# Patient Record
Sex: FEMALE | Race: BLACK OR AFRICAN AMERICAN | Employment: UNEMPLOYED | URBAN - METROPOLITAN AREA
[De-identification: names, ages, dates, MRNs, and addresses within clinical notes are randomized per-mention and may not be internally consistent; named-entity substitution may affect disease eponyms.]

---

## 2017-06-26 ENCOUNTER — HOSPITAL ENCOUNTER (INPATIENT)
Facility: CLINIC | Age: 27
LOS: 3 days | Discharge: PSYCHIATRIC OR SUBSTANCE USE FACILITY | DRG: 897 | End: 2017-06-30
Attending: EMERGENCY MEDICINE | Admitting: FAMILY MEDICINE
Payer: COMMERCIAL

## 2017-06-26 DIAGNOSIS — F11.20 HEROIN DEPENDENCE (H): ICD-10-CM

## 2017-06-26 LAB
AMPHETAMINES UR QL SCN: ABNORMAL
BARBITURATES UR QL: ABNORMAL
BENZODIAZ UR QL: ABNORMAL
CANNABINOIDS UR QL SCN: ABNORMAL
COCAINE UR QL: ABNORMAL
ETHANOL UR QL SCN: ABNORMAL
HCG UR QL: NEGATIVE
OPIATES UR QL SCN: ABNORMAL

## 2017-06-26 PROCEDURE — 80307 DRUG TEST PRSMV CHEM ANLYZR: CPT | Performed by: EMERGENCY MEDICINE

## 2017-06-26 PROCEDURE — 81025 URINE PREGNANCY TEST: CPT | Performed by: EMERGENCY MEDICINE

## 2017-06-26 PROCEDURE — 99285 EMERGENCY DEPT VISIT HI MDM: CPT | Mod: Z6 | Performed by: EMERGENCY MEDICINE

## 2017-06-26 PROCEDURE — HZ2ZZZZ DETOXIFICATION SERVICES FOR SUBSTANCE ABUSE TREATMENT: ICD-10-PCS | Performed by: FAMILY MEDICINE

## 2017-06-26 PROCEDURE — 25000125 ZZHC RX 250: Performed by: EMERGENCY MEDICINE

## 2017-06-26 PROCEDURE — 80320 DRUG SCREEN QUANTALCOHOLS: CPT | Performed by: EMERGENCY MEDICINE

## 2017-06-26 PROCEDURE — 99285 EMERGENCY DEPT VISIT HI MDM: CPT | Mod: 25 | Performed by: EMERGENCY MEDICINE

## 2017-06-26 RX ORDER — ONDANSETRON 4 MG/1
4 TABLET, ORALLY DISINTEGRATING ORAL ONCE
Status: COMPLETED | OUTPATIENT
Start: 2017-06-26 | End: 2017-06-26

## 2017-06-26 RX ADMIN — ONDANSETRON 4 MG: 4 TABLET, ORALLY DISINTEGRATING ORAL at 18:35

## 2017-06-26 ASSESSMENT — ENCOUNTER SYMPTOMS
FEVER: 0
DIARRHEA: 0
HEADACHES: 0
NECK STIFFNESS: 0
COLOR CHANGE: 0
DIFFICULTY URINATING: 0
EYE REDNESS: 0
CONFUSION: 0
MYALGIAS: 1
SHORTNESS OF BREATH: 0
DYSPHORIC MOOD: 1
ARTHRALGIAS: 0
VOMITING: 0
NAUSEA: 1
ABDOMINAL PAIN: 1

## 2017-06-26 NOTE — PHARMACY-ADMISSION MEDICATION HISTORY
Admission Medication History status for the 6/26/2017 admission is complete.  See EPIC admission navigator for Prior to Admission medications.    Medication history sources:  Patient     Medication history source reliability: Good - Patient sleeping upon interview start    Medication adherence:  NA    Changes made to PTA medication list (reason)  Added: None  Deleted: None  Changed: None    Additional medication history information (including reliability of information, actions taken by pharmacist):     Time spent in this activity: 1 min    Medication history completed by: Bob Hernandez RPh    Prior to Admission medications    Not on File

## 2017-06-26 NOTE — IP AVS SNAPSHOT
MRN:[id number]                      After Visit Summary   6/26/2017    Dawn Caldwell    MRN: [id number]           Thank you!     Thank you for choosing [facility] for your care. Our goal is always to provide you with excellent care.        Patient Information     Date Of Birth          1990        Designated Caregiver       Most Recent Value    Caregiver    Will someone help with your care after discharge? no      About your hospital stay     You were admitted on:  June 27, 2017 You last received care in the:  [facility]    You were discharged on:  June 30, 2017       Who to Call     For medical emergencies, please call 911.  For non-urgent questions about your medical care, please call your primary care provider or clinic, [phone]          Attending Provider     Provider Specialty    Nj Pascual MD Emergency Medicine    Zenaida, Brant Orozco MD Family Practice       Primary Care Provider Office Phone # Fax #    [facility] [phone]      Further instructions from your care team       Behavioral Discharge Planning and Instructions  THANK YOU FOR CHOOSING THE [address]  [phone]    Summary: You were admitted to [facility] on 6/26/2017 for detoxification from Opioids.  A medical exam was performed that included lab work. You have met with a  and opted to transfer directly to [place] with transportation provided by the program. In addition, suboxone maintenance through [facility].  Please take care and make your recovery a daily priority, Dawn!     Recommendation:  All women residential treatment program. Consider suboxone maintenance. Also consider psychotherapy weekly. The following resources were recommended and provided:  Breaking Free, [facility]ways ~ A Health Crisis Resource & Support Center, Women's AdvocatesJoshua.    Main Diagnoses:  Per Dr. Estevez;  Heroin dependence      Major Treatments, Procedures and Findings:  You were treated for Opioid detoxification using Subutex (buprenorphine). As an outpatient you will be prescribed suboxone, please take this medication as prescribed until it is gone and you have begun suboxone treatment through a clinic provider. You have had a chemical dependency assessment.  You have had labs drawn and those results have been reviewed with you. Please take a copy of your lab work with you to your next primary care physician appointment.    Symptoms to Report:  If you experience more anxiety, confusion, sleeplessness, deep sadness or thoughts of suicide, notify your treatment team or notify your primary care physician. IF ANY OF THE SYMPTOMS YOU ARE EXPERIENCING ARE A MEDICAL EMERGENCY CALL 911 IMMEDIATELY.     Lifestyle Adjustment: Adjust your lifestyle to get enough sleep, relaxation, exercise and good nutrition. Continue to develop healthy coping skills to decrease stress and promote a sober living environment. Do not use mood altering substances including alcohol, illegal drugs or addictive medications other than what is currently prescribed.     Treatment Program Provider:  Admission on Friday, 6/29/2017 with pickup between 1 - 2 PM  [facility]  [address] [place] [address]  Ph:  [phone]   [place]:  [phone]    Suboxone Maintenance Provider:  Appointment on  at    [facility]  [address] [place] [address]  Ph:  [phone]    You report you are awaiting a return call at this time from the clinic after calling them yesterday.    Resources:   [facility] ~ A Health Crisis Resource & Support Center:  744.249.9446.   [employer], a non-profit organization to support and assist post-incarceration:  889.892.4213.  Breaking Free:  830-205- 2974  Women's Advocates ~ Breaking the Cycle of Domestic Violence:   066-793-8968  http://www.aastpaul.org/?topic=8  http://a[place].org/meetings/  http://www.nam[place].org/index.php/meeting-list-pdf  http://www.harmreduction.org  [facility] 885-436-9841  Support Group:  AA/NA and Sponsor/support  Crisis Intervention: 941.256.1034 or 780-821-9517 (TTY: 978.497.3877).  Call anytime for help.  National [place] on Mental Illness (www.mn.quin.org): 523.868.2598 or 158-265-2993.  Alcoholics Anonymous (www.alcoholics-anonymous.org): Check your phone book for your local chapter.  Suicide Awareness Voices of Education (SAVE) (www.save.org): 980-862-HGLX (0407)  National Suicide Prevention Line (www.mentalA10 Networksmn.org): 488-692-ZTLY (8917)  Mental Health Consumer/Survivor Network of [place] (www.mhcsn.net): 686.386.1484 or 293-150-5638  Mental Health Association of [place] (www.mentalhealth.org): 349.547.4391 or 275-536-5706   Substance Abuse and Mental Health Services (www.samhsa.gov)    [place] Recovery Connection ([facility])  [facility] connects people seeking recovery to resources that help foster and sustain long-term recovery.  Whether you are seeking resources for treatment, transportation, housing, job training, education, health care or other pathways to recovery, [facility] is a great place to start.  826.874.3566.  www.[place]arecovery.org    General Medication Instructions:   See your medication sheet(s) for instructions.   Take all medicines as directed.  Make no changes unless your doctor suggests them.   Go to all your doctor visits.  Be sure to have all your required lab tests. This way, your medicines can be refilled on time.  AA/NA and Sponsors are excellent resources for support and you can find one at any support group meeting.  Do not use any forms of alcohol.    Please Note:  If you have any questions at anytime after you are discharged please call the [facility] [place] detox unit 3AW unit at [phone].  [facility],  "Behavioral Intake [phone]  Please take this discharge folder with you to all your follow up appointments, it contains your lab results, diagnosis, medication list and discharge recommendations.      THANK YOU FOR CHOOSING THE [facility]       Pending Results     No orders found from 2017 to 2017.            Statement of Approval     Ordered          17 0946  I have reviewed and agree with all the recommendations and orders detailed in this document.  EFFECTIVE NOW     Approved and electronically signed by:  Brant Estevez MD           17 0914  I have reviewed and agree with all the recommendations and orders detailed in this document.  EFFECTIVE NOW     Approved and electronically signed by:  Brant Estevez MD             Admission Information     Date & Time Provider Department Dept. Phone    2017 Brant Estevez MD [facility] [phone]      Your Vitals Were     Blood Pressure Pulse Temperature Respirations Height Weight    111/72 52 96.3  F (35.7  C) (Tympanic) 16 1.626 m (5' 4\") 49 kg (108 lb)    Last Period Pulse Oximetry BMI (Body Mass Index)             2017 98% 18.54 kg/m2         MyChart Information     [employer] lets you send messages to your doctor, view your test results, renew your prescriptions, schedule appointments and more. To sign up, go to [email or web address] . Click on \"Log in\" on the left side of the screen, which will take you to the Welcome page. Then click on \"Sign up Now\" on the right side of the page.     You will be asked to enter the access code listed below, as well as some personal information. Please follow the directions to create your username and password.     Your access code is: [email or web address]  Expires: 2017  7:22 AM     Your access code will  in 90 days. If you need help or a new code, please call your [place] clinic or [phone].        Care EveryWhere ID     This " is your Care EveryWhere ID. This could be used by other organizations to access your [facility] medical records  [id number]        Equal Access to Services     ALISSA HOWELL : Hadii aad ku hadkathereji Greer, wasaurabhda luflorentinobasilia, jaita kajenniferda sergio, escobar yates jasonsheila ricardo laaleidapineda seven. So [facility] [phone].    ATENCIÓN: Si habla español, tiene a mott disposición servicios gratuitos de asistencia lingüística. Llame al [phone].    We comply with applicable federal civil rights laws and [place] laws. We do not discriminate on the basis of race, color, national origin, age, disability sex, sexual orientation or gender identity.               Review of your medicines      START taking        Dose / Directions    buprenorphine-naloxone 8-2 MG Subl sublingual tablet   Commonly known as:  SUBOXONE        Dose:  1 tablet   Place 1 tablet under the tongue daily   Quantity:  15 each   Refills:  1            Where to get your medicines      Some of these will need a paper prescription and others can be bought over the counter. Ask your nurse if you have questions.     Bring a paper prescription for each of these medications     buprenorphine-naloxone 8-2 MG Subl sublingual tablet                Protect others around you: Learn how to safely use, store and throw away your medicines at www.disposemymeds.org.             Medication List: This is a list of all your medications and when to take them. Check marks below indicate your daily home schedule. Keep this list as a reference.      Medications           Morning Afternoon Evening Bedtime As Needed    buprenorphine-naloxone 8-2 MG Subl sublingual tablet   Commonly known as:  SUBOXONE   Place 1 tablet under the tongue daily

## 2017-06-26 NOTE — IP AVS SNAPSHOT
[facility]    [facility] [place] [id number]    Phone:  [phone]                                       After Visit Summary   6/26/2017    Dawn Caldwell    MRN: [id number]           After Visit Summary Signature Page     I have received my discharge instructions, and my questions have been answered. I have discussed any challenges I see with this plan with the nurse or doctor.    ..........................................................................................................................................  Patient/Patient Representative Signature      ..........................................................................................................................................  Patient Representative Print Name and Relationship to Patient    ..................................................               ................................................  Date                                            Time    ..........................................................................................................................................  Reviewed by Signature/Title    ...................................................              ..............................................  Date                                                            Time

## 2017-06-26 NOTE — ED PROVIDER NOTES
History     Chief Complaint   Patient presents with     Withdrawal     States she is withdrawing from heroin and cocaine and xanax.  Feeling weak, loss of appetite, feeling paranoid.  Would like detox.      DIANE Caldwell is a 26 year old female who presents to the emergency Department seeking detox from heroin.  The patient states that she's been using one and a half grams of heroin daily for the past 2 years.  She states that she uses the heroin both IV and by smoking.  She last used approximately 24 hours ago.  Patient endorses withdrawal symptoms that include abdominal cramping, nausea, diaphoresis, chills, and myalgias.    Patient also states that 2 weeks ago, she began using Xanax.  She was using 2 mg per day.  She states that she has not used Xanax in the past 4 days and denies any symptoms when she stopped using.  The patient also reports using crack cocaine occasionally.  She smokes that.  She last used it yesterday.  Patient reports depression but denies suicidal ideation or any active plan.  She supposed to be on antidepressants but has not taken any.  She denies any recent illness or medical concerns.    I have reviewed the Medications, Allergies, Past Medical and Surgical History, and Social History in the Epic system.    Review of Systems   Constitutional: Negative for fever.   HENT: Negative for congestion.    Eyes: Negative for redness.   Respiratory: Negative for shortness of breath.    Cardiovascular: Negative for chest pain.   Gastrointestinal: Positive for abdominal pain (cramping) and nausea. Negative for diarrhea and vomiting.   Genitourinary: Negative for difficulty urinating.   Musculoskeletal: Positive for myalgias. Negative for arthralgias and neck stiffness.   Skin: Negative for color change.   Neurological: Negative for headaches.   Psychiatric/Behavioral: Positive for dysphoric mood. Negative for confusion.   All other systems reviewed and are negative.      Physical Exam   BP:  128/82  Pulse: 70  Temp: 98.8  F (37.1  C)  Resp: 16  Weight: 49.1 kg (108 lb 3 oz)  SpO2: 98 %  Physical Exam   Constitutional: She appears well-developed and well-nourished. No distress.   HENT:   Head: Normocephalic and atraumatic.   Mouth/Throat: Oropharynx is clear and moist. No oropharyngeal exudate.   Eyes: Pupils are equal, round, and reactive to light. No scleral icterus.   Cardiovascular: Normal rate, regular rhythm, normal heart sounds and intact distal pulses.    Pulmonary/Chest: Effort normal and breath sounds normal. No respiratory distress.   Abdominal: Soft. Bowel sounds are normal. There is no tenderness.   Musculoskeletal: Normal range of motion. She exhibits no edema or tenderness.   Left arm injection site appears clean and without erythema, swelling, or drainage.   Neurological: She is alert. She has normal strength. Coordination normal.   Skin: Skin is warm and dry. No rash noted. She is not diaphoretic.   Psychiatric: She has a normal mood and affect. Her behavior is normal.   Nursing note and vitals reviewed.      ED Course     ED Course     Procedures             Results for orders placed or performed during the hospital encounter of 06/26/17 (from the past 24 hour(s))   Drug abuse screen 6 urine (tox)   Result Value Ref Range    Amphetamine Qual Urine  NEG     Negative   Cutoff for a negative amphetamine is 500 ng/mL or less.      Barbiturates Qual Urine  NEG     Negative   Cutoff for a negative barbiturate is 200 ng/mL or less.      Benzodiazepine Qual Urine  NEG     Negative   Cutoff for a negative benzodiazepine is 200 ng/mL or less.      Cannabinoids Qual Urine (A) NEG     Positive   Cutoff for a positive cannabinoid is greater than 50 ng/mL. This is an   unconfirmed screening result to be used for medical purposes only.      Cocaine Qual Urine (A) NEG     Positive   Cutoff for a positive cocaine is greater than 300 ng/mL. This is an unconfirmed   screening result to be used for medical  purposes only.      Ethanol Qual Urine  NEG     Negative   Cutoff for a negative urine ethanol is 0.05 g/dL or less      Opiates Qualitative Urine (A) NEG     Positive   Cutoff for a positive opiate is greater than 300 ng/mL. This is an unconfirmed   screening result to be used for medical purposes only.     HCG qualitative urine   Result Value Ref Range    HCG Qual Urine Negative NEG             Assessments & Plan (with Medical Decision Making)   26 year old female emergency department seeking detox from heroin.  Patient's been using heroin daily for the past 2 years and experiences withdrawal symptoms with attempted cessation.  Patient last used approximately 24 hours ago and is currently experiencing withdrawal symptoms.  The patient does not have any medical concerns.  Her IV injection sites do not appear infected.  The patient has depression but no active suicide ideation or plan for self-harm.  The patient appears medically stable for detox admission.    I have reviewed the nursing notes.    I have reviewed the findings, diagnosis, plan and need for follow up with the patient.    New Prescriptions    No medications on file       Final diagnoses:   Heroin dependence (H)       6/26/2017   [facility], [place], EMERGENCY DEPARTMENT     Nj Pascual MD  06/26/17 8414

## 2017-06-26 NOTE — IP AVS SNAPSHOT
" [facility]: [phone]                                              INTERAGENCY TRANSFER FORM - LAB / IMAGING / EKG / EMG RESULTS   2017                    Hospital Admission Date: 2017  TASHA DE LA TORRE   : 1990  Sex: Female        Attending Provider: Brant Estevez MD     Allergies:  Codeine    Infection:  None   Service:  CHEMICAL DEP    Ht:  1.626 m (5' 4\")   Wt:  49 kg (108 lb)   Admission Wt:  49.1 kg (108 lb 3 oz)    BMI:  18.54 kg/m 2   BSA:  1.49 m 2            Patient PCP Information     Provider PCP Type    [facility]         Lab Results - 3 Days      HIV Antigen Antibody Combo [[id number]]  Resulted: 17 1313, Result status: Final result    Ordering provider: Brant Estevez MD  17 Resulting lab: [facility]    Specimen Information    Type Source Collected On   Blood  17 0750          Components       Value Reference Range Flag Lab   HIV Antigen Antibody Combo -- NR  75   Result:         Nonreactive   HIV-1 p24 Ag & HIV-1/HIV-2 Ab Not Detected              Comprehensive metabolic panel [[id number]] (Abnormal)  Resulted: 17 0844, Result status: Final result    Ordering provider: Brant Estevez MD  17 Resulting lab: [facility]    Specimen Information    Type Source Collected On   Blood  17 0750          Components       Value Reference Range Flag Lab   Sodium 144 133 - 144 mmol/L  13   Potassium 5.0 3.4 - 5.3 mmol/L  13   Chloride 109 94 - 109 mmol/L  13   Carbon Dioxide 28 20 - 32 mmol/L  13   Anion Gap 7 3 - 14 mmol/L  13   Glucose 101 70 - 99 mg/dL H 13   Urea Nitrogen 16 7 - 30 mg/dL  13   Creatinine 1.29 0.52 - 1.04 mg/dL H 13   GFR Estimate 49 >60 mL/min/1.7m2 L 13   Comment:  Non  GFR Calc   GFR Estimate If Black 60 >60 mL/min/1.7m2 L 13   Comment:  African American GFR Calc   Calcium 8.9 " 8.5 - 10.1 mg/dL  13   Bilirubin Total 0.4 0.2 - 1.3 mg/dL  13   Albumin 3.2 3.4 - 5.0 g/dL L 13   Protein Total 6.5 6.8 - 8.8 g/dL L 13   Alkaline Phosphatase 120 40 - 150 U/L  13   ALT 61 0 - 50 U/L H 13   AST 69 0 - 45 U/L H 13            CBC with platelets [[id number]]  Resulted: 06/28/17 0822, Result status: Final result    Ordering provider: Brant Estevez MD  06/28/17 0030 Resulting lab: [facility]    Specimen Information    Type Source Collected On   Blood  06/28/17 0750          Components       Value Reference Range Flag Lab   WBC 10.4 4.0 - 11.0 10e9/L  13   RBC Count 4.09 3.8 - 5.2 10e12/L  13   Hemoglobin 12.9 11.7 - 15.7 g/dL  13   Hematocrit 38.3 35.0 - 47.0 %  13   MCV 94 78 - 100 fl  13   MCH 31.5 26.5 - 33.0 pg  13   MCHC 33.7 31.5 - 36.5 g/dL  13   RDW 13.4 10.0 - 15.0 %  13   Platelet Count 285 150 - 450 10e9/L  13            Testing Performed By     Lab - Abbreviation Name Director Address Valid Date Range    13 - Unknown [facility] Unknown [address] [facility] [id number] 01/15/15 0916 - Present    75 - Unknown [facility] Unknown [address] [facility] [address] 01/15/15 1019 - Present            Unresulted Labs     None      Encounter-Level Documents:     There are no encounter-level documents.      Order-Level Documents:     There are no order-level documents.

## 2017-06-27 PROBLEM — F11.20 HEROIN DEPENDENCE (H): Status: ACTIVE | Noted: 2017-06-27

## 2017-06-27 PROCEDURE — 12800008 ZZH R&B CD ADULT

## 2017-06-27 PROCEDURE — 25000132 ZZH RX MED GY IP 250 OP 250 PS 637: Performed by: FAMILY MEDICINE

## 2017-06-27 PROCEDURE — 99222 1ST HOSP IP/OBS MODERATE 55: CPT | Mod: AI | Performed by: FAMILY MEDICINE

## 2017-06-27 PROCEDURE — 25000132 ZZH RX MED GY IP 250 OP 250 PS 637: Performed by: EMERGENCY MEDICINE

## 2017-06-27 RX ORDER — NALOXONE HYDROCHLORIDE 0.4 MG/ML
.1-.4 INJECTION, SOLUTION INTRAMUSCULAR; INTRAVENOUS; SUBCUTANEOUS
Status: DISCONTINUED | OUTPATIENT
Start: 2017-06-27 | End: 2017-06-30 | Stop reason: HOSPADM

## 2017-06-27 RX ORDER — BUPRENORPHINE 2 MG/1
4 TABLET SUBLINGUAL ONCE
Status: COMPLETED | OUTPATIENT
Start: 2017-06-27 | End: 2017-06-27

## 2017-06-27 RX ORDER — NICOTINE 21 MG/24HR
1 PATCH, TRANSDERMAL 24 HOURS TRANSDERMAL DAILY
Status: DISCONTINUED | OUTPATIENT
Start: 2017-06-27 | End: 2017-06-30 | Stop reason: HOSPADM

## 2017-06-27 RX ORDER — BUPRENORPHINE 2 MG/1
4 TABLET SUBLINGUAL 2 TIMES DAILY
Status: DISCONTINUED | OUTPATIENT
Start: 2017-06-28 | End: 2017-06-30 | Stop reason: HOSPADM

## 2017-06-27 RX ORDER — HYDROXYZINE HYDROCHLORIDE 25 MG/1
25-50 TABLET, FILM COATED ORAL EVERY 4 HOURS PRN
Status: DISCONTINUED | OUTPATIENT
Start: 2017-06-27 | End: 2017-06-30 | Stop reason: HOSPADM

## 2017-06-27 RX ORDER — TRAZODONE HYDROCHLORIDE 50 MG/1
50 TABLET, FILM COATED ORAL
Status: DISCONTINUED | OUTPATIENT
Start: 2017-06-27 | End: 2017-06-30 | Stop reason: HOSPADM

## 2017-06-27 RX ADMIN — BUPRENORPHINE HCL 4 MG: 2 TABLET SUBLINGUAL at 09:45

## 2017-06-27 RX ADMIN — HYDROXYZINE HYDROCHLORIDE 50 MG: 25 TABLET ORAL at 08:42

## 2017-06-27 RX ADMIN — TRAZODONE HYDROCHLORIDE 50 MG: 50 TABLET ORAL at 21:17

## 2017-06-27 RX ADMIN — BUPRENORPHINE HCL 4 MG: 2 TABLET SUBLINGUAL at 16:16

## 2017-06-27 RX ADMIN — NICOTINE 1 PATCH: 21 PATCH, EXTENDED RELEASE TRANSDERMAL at 08:42

## 2017-06-27 ASSESSMENT — ACTIVITIES OF DAILY LIVING (ADL)
GROOMING: INDEPENDENT
DRESS: INDEPENDENT
GROOMING: INDEPENDENT
LAUNDRY: WITH SUPERVISION
LAUNDRY: WITH SUPERVISION
ORAL_HYGIENE: INDEPENDENT
DRESS: SCRUBS (BEHAVIORAL HEALTH)
GROOMING: INDEPENDENT
ORAL_HYGIENE: INDEPENDENT

## 2017-06-27 NOTE — PROGRESS NOTES
CASE MANAGEMENT NOTE    UPDATE:  Patient approached this writer asking for direct transfer to [facility] following detox. She spoke with her family today who are supportive and are requiring her completion of treatment before returning home.    ORIGINAL:  Writer met with patient to discuss discharge planning. Patient lives with her aunt who is a  in a supportive sober environment. Patient became tearful when she explained that neither her aunt nor family know she is here in detox. Her last treatment was in [place] in 2014 followed by 2.5 years of detention. She had a Rule 25 assessment completed at [facility] about 1 month ago and the recommendations were inpatient treatment. She is asking for placement in an outpatient treatment program and methadone maintenance in/near [facility] so her family would not find out. Patient was directed to complete her paperwork.     Carolina Sharpe) TAMY Leach, [facility]  3A Psychotherapist  [phone]

## 2017-06-27 NOTE — PROGRESS NOTES
"CLINICAL NUTRITION SERVICES - ASSESSMENT NOTE     Nutrition Prescription    RECOMMENDATIONS FOR MDs/PROVIDERS TO ORDER:  None    Malnutrition Status:    Patient does not meet two of the  criteria necessary for diagnosing malnutrition    Recommendations already ordered by Registered Dietitian (RD):  Ordered fruit + ice cream @ 10am snack    Future/Additional Recommendations:  1. Monitor PO intake/weight trends and snack acceptance to adjust orders as needed. If pt unable to consume at least 50% of meals offer nutritional supplements to increase PO intakes.      REASON FOR ASSESSMENT  Dawn Caldwell is a/an 26 year old female assessed by the dietitian for Admission Nutrition Risk Screen for unintentional loss of 10# or more in the past two months    NUTRITION HISTORY  Per discussion with pt, she has noticed a 10# weight loss since December compared to her current weight. She reports about a month ago, her weight was even lower, but has been working to eat better to increase her weight back up. She reports eating poorly PTA 2/2 drug use and not eating. However, when she is eating well she follows a regular diet and tries to consume 3 meals per day.     CURRENT NUTRITION ORDERS  Diet: Regular  Intake/Tolerance: Pt reports her appetite is good and feels hungry to eat, however, is experiencing nausea and vomiting. She reports typically the nausea medication helps and is able to eat well. She is motivated to eat to increase her weight and anticipates her PO intake to improve.     LABS  Labs reviewed    MEDICATIONS  Medications reviewed    ANTHROPOMETRICS  Height: 162.6 cm (5' 4\")  Most Recent Weight: 49 kg (108 lb)    IBW: 54.5 kg (120#)  BMI: 18.54 kg/m^2 - Borderline Underweight vs. Normal BMI   Weight History: Difficult to assess weight trends given lack of recent weight records. Per pt, her highest weight was about 117# this past December and is now around 108#. She reports her weight was even lower about 1 month prior, " and was trying to eat more to increase weight.  Per pts report, 8% wt loss x 6 months.   Wt Readings from Last 10 Encounters:   06/27/17 49 kg (108 lb)     Dosing Weight: 49 kg - based on admission weight    ASSESSED NUTRITION NEEDS  Estimated Energy Needs: 7224-4320 kcals/day (30 - 35 kcals/kg)  Justification: Borderline Underweight   Estimated Protein Needs: 49-59 grams protein/day (1 - 1.2 grams of pro/kg)  Justification: Repletion  Estimated Fluid Needs:  (1 mL/kcal)   Justification: Maintenance    PHYSICAL FINDINGS  See malnutrition section below.  Appears well-developed and well-nourished per ED     MALNUTRITION  % Intake: Decreased intake does not meet criteria  % Weight Loss: Up to 10% in 6 months (non-severe)  Subcutaneous Fat Loss: None observed  Muscle Loss: None observed  Fluid Accumulation/Edema: None noted  Malnutrition Diagnosis: Patient does not meet two of the above criteria necessary for diagnosing malnutrition    NUTRITION DIAGNOSIS  Predicted inadequate nutrient intake related to hx of poor PO intake 2/2 drug use and 8% wt loss x 6 months, however PO intake is improving and anticipate PO will continue to improve with treatment.       INTERVENTIONS  Implementation  Nutrition Education: Discussed nutrition history and PO since admission. Discussed menu ordering and snacks available on the unit. Discussed oral nutrition supplements and pt open to trying snacks such as fruit and ice cream at this time. Encouraged adequate PO of food and fluids.   Modify composition of meals/snacks - Ordered snacks of cantaloupe + ice cream (vanilla) at 10am      Goals  Patient to consume % of nutritionally adequate meal trays TID, or the equivalent with supplements/snacks.     Monitoring/Evaluation  Progress toward goals will be monitored and evaluated per protocol.    Nanda Mercer RD  Unit Pager: [phone]    I reviewed and agree with above.  Carleen Sampson, JACKELYN, LD

## 2017-06-27 NOTE — PROGRESS NOTES
S;Pt via Ed for Detox from opiates.  B;Pt has been using 1gm heroin, Iv or smoked daily, last use 6/25/17@1000. Pt also using crack cocaine,$50 dalilt, last use 6/25/17@1000. Pt also used 2mg Xanax daily x10 days,last use 6/22/17. Pt has a history of hepatitis C. Pt denies any other chronic or any acute illness or injury. Pt has a history of depression but denies any past or current suicidal ideation.  A:Pt is in mild/moderate opiate withdrawal on admission.  R:Pt would like to iniate withdrawal treatment with attending MD in am. Will provide comfort meds as needed. 15 min checks and withdrawal precautions for safety.

## 2017-06-27 NOTE — PROGRESS NOTES
06/27/17 0033   Patient Belongings   Did you bring any home meds/supplements to the hospital?  No   Patient Belongings other (see comments)   Disposition of Belongings Tote, Security, Locked Drawer   Belongings Search Yes   Clothing Search Yes   Second Staff Delilah RN 3AW, Beth RN 4AW     Tote: white top, black jacket, make-up and assorted pens in brown striped purse, cigarettes, sunglasses, glasses, 2 bracelets,  port, chapstick, notebook    Locked Drawer: cell phone, wallet,     Security: $25, visa 3840, macys 5746, EBT 0343, [place] drivers license, [employer] ID on lanyard with keys    ADMISSION:  I am responsible for any personal items that are not sent to the safe or pharmacy. [facility] is not responsible for loss, theft or damage of any property in my possession.    Patient Signature _____________________ Date/Time _____________________    Staff Signature _______________________ Date/Time _____________________    2nd Staff person, if patient is unable/unwilling to sign  ___________________________________ Date/Time _____________________  DISCHARGE:  All personal items have been returned to me.    Patient Signature _____________________ Date/Time _____________________    Staff Signature _______________________ Date/Time _____________________

## 2017-06-28 LAB
ALBUMIN SERPL-MCNC: 3.2 G/DL (ref 3.4–5)
ALP SERPL-CCNC: 120 U/L (ref 40–150)
ALT SERPL W P-5'-P-CCNC: 61 U/L (ref 0–50)
ANION GAP SERPL CALCULATED.3IONS-SCNC: 7 MMOL/L (ref 3–14)
AST SERPL W P-5'-P-CCNC: 69 U/L (ref 0–45)
BILIRUB SERPL-MCNC: 0.4 MG/DL (ref 0.2–1.3)
BUN SERPL-MCNC: 16 MG/DL (ref 7–30)
CALCIUM SERPL-MCNC: 8.9 MG/DL (ref 8.5–10.1)
CHLORIDE SERPL-SCNC: 109 MMOL/L (ref 94–109)
CO2 SERPL-SCNC: 28 MMOL/L (ref 20–32)
CREAT SERPL-MCNC: 1.29 MG/DL (ref 0.52–1.04)
ERYTHROCYTE [DISTWIDTH] IN BLOOD BY AUTOMATED COUNT: 13.4 % (ref 10–15)
GFR SERPL CREATININE-BSD FRML MDRD: 49 ML/MIN/1.7M2
GLUCOSE SERPL-MCNC: 101 MG/DL (ref 70–99)
HCT VFR BLD AUTO: 38.3 % (ref 35–47)
HGB BLD-MCNC: 12.9 G/DL (ref 11.7–15.7)
HIV 1+2 AB+HIV1 P24 AG SERPL QL IA: NORMAL
MCH RBC QN AUTO: 31.5 PG (ref 26.5–33)
MCHC RBC AUTO-ENTMCNC: 33.7 G/DL (ref 31.5–36.5)
MCV RBC AUTO: 94 FL (ref 78–100)
PLATELET # BLD AUTO: 285 10E9/L (ref 150–450)
POTASSIUM SERPL-SCNC: 5 MMOL/L (ref 3.4–5.3)
PROT SERPL-MCNC: 6.5 G/DL (ref 6.8–8.8)
RBC # BLD AUTO: 4.09 10E12/L (ref 3.8–5.2)
SODIUM SERPL-SCNC: 144 MMOL/L (ref 133–144)
WBC # BLD AUTO: 10.4 10E9/L (ref 4–11)

## 2017-06-28 PROCEDURE — 12800008 ZZH R&B CD ADULT

## 2017-06-28 PROCEDURE — 80053 COMPREHEN METABOLIC PANEL: CPT | Performed by: FAMILY MEDICINE

## 2017-06-28 PROCEDURE — 99231 SBSQ HOSP IP/OBS SF/LOW 25: CPT | Performed by: FAMILY MEDICINE

## 2017-06-28 PROCEDURE — 87389 HIV-1 AG W/HIV-1&-2 AB AG IA: CPT | Performed by: FAMILY MEDICINE

## 2017-06-28 PROCEDURE — 25000132 ZZH RX MED GY IP 250 OP 250 PS 637: Performed by: FAMILY MEDICINE

## 2017-06-28 PROCEDURE — 36415 COLL VENOUS BLD VENIPUNCTURE: CPT | Performed by: FAMILY MEDICINE

## 2017-06-28 PROCEDURE — 25000132 ZZH RX MED GY IP 250 OP 250 PS 637: Performed by: EMERGENCY MEDICINE

## 2017-06-28 PROCEDURE — 85027 COMPLETE CBC AUTOMATED: CPT | Performed by: FAMILY MEDICINE

## 2017-06-28 RX ORDER — BUPRENORPHINE AND NALOXONE 4; 1 MG/1; MG/1
1 FILM, SOLUBLE BUCCAL; SUBLINGUAL
Qty: 30 FILM | Refills: 0 | Status: SHIPPED | OUTPATIENT
Start: 2017-06-28 | End: 2017-06-29

## 2017-06-28 RX ORDER — ONDANSETRON 4 MG/1
4 TABLET, ORALLY DISINTEGRATING ORAL EVERY 6 HOURS PRN
Status: DISCONTINUED | OUTPATIENT
Start: 2017-06-28 | End: 2017-06-30 | Stop reason: HOSPADM

## 2017-06-28 RX ADMIN — HYDROXYZINE HYDROCHLORIDE 50 MG: 25 TABLET ORAL at 20:14

## 2017-06-28 RX ADMIN — HYDROXYZINE HYDROCHLORIDE 50 MG: 25 TABLET ORAL at 09:18

## 2017-06-28 RX ADMIN — BUPRENORPHINE HCL 4 MG: 2 TABLET SUBLINGUAL at 09:15

## 2017-06-28 RX ADMIN — BUPRENORPHINE HCL 4 MG: 2 TABLET SUBLINGUAL at 16:10

## 2017-06-28 RX ADMIN — NICOTINE 1 PATCH: 21 PATCH, EXTENDED RELEASE TRANSDERMAL at 09:15

## 2017-06-28 ASSESSMENT — ENCOUNTER SYMPTOMS
CHEST PRESSURE: 0
ACTIVITY IMPAIRMENT: NORMAL
DIARRHEA: 0
NO PATIENT REPORTED PAIN: 1
SHORTNESS OF BREATH: 0
ANOREXIA: 0
COUGH: 0
DIETARY ISSUES: ADEQUATE INTAKE
WEAKNESS: 0

## 2017-06-28 ASSESSMENT — ACTIVITIES OF DAILY LIVING (ADL)
GROOMING: INDEPENDENT
DRESS: STREET CLOTHES
LAUNDRY: WITH SUPERVISION
ORAL_HYGIENE: INDEPENDENT
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
## 2017-06-29 PROCEDURE — 99231 SBSQ HOSP IP/OBS SF/LOW 25: CPT | Performed by: FAMILY MEDICINE

## 2017-06-29 PROCEDURE — 25000132 ZZH RX MED GY IP 250 OP 250 PS 637: Performed by: FAMILY MEDICINE

## 2017-06-29 PROCEDURE — H2035 A/D TX PROGRAM, PER HOUR: HCPCS

## 2017-06-29 PROCEDURE — 12800008 ZZH R&B CD ADULT

## 2017-06-29 PROCEDURE — 25000132 ZZH RX MED GY IP 250 OP 250 PS 637: Performed by: EMERGENCY MEDICINE

## 2017-06-29 RX ORDER — BUPRENORPHINE HYDROCHLORIDE AND NALOXONE HYDROCHLORIDE DIHYDRATE 8; 2 MG/1; MG/1
1 TABLET SUBLINGUAL DAILY
Qty: 15 EACH | Refills: 1 | Status: SHIPPED | OUTPATIENT
Start: 2017-06-29

## 2017-06-29 RX ADMIN — HYDROXYZINE HYDROCHLORIDE 50 MG: 25 TABLET ORAL at 20:14

## 2017-06-29 RX ADMIN — BUPRENORPHINE HCL 4 MG: 2 TABLET SUBLINGUAL at 08:47

## 2017-06-29 RX ADMIN — HYDROXYZINE HYDROCHLORIDE 50 MG: 25 TABLET ORAL at 08:47

## 2017-06-29 RX ADMIN — NICOTINE 1 PATCH: 21 PATCH, EXTENDED RELEASE TRANSDERMAL at 08:47

## 2017-06-29 RX ADMIN — TRAZODONE HYDROCHLORIDE 50 MG: 50 TABLET ORAL at 21:33

## 2017-06-29 RX ADMIN — BUPRENORPHINE HCL 4 MG: 2 TABLET SUBLINGUAL at 16:32

## 2017-06-29 ASSESSMENT — ACTIVITIES OF DAILY LIVING (ADL)
GROOMING: INDEPENDENT
GROOMING: INDEPENDENT
DRESS: STREET CLOTHES
LAUNDRY: WITH SUPERVISION
ORAL_HYGIENE: INDEPENDENT

## 2017-06-29 ASSESSMENT — ENCOUNTER SYMPTOMS
WEAKNESS: 0
ANOREXIA: 0
ACTIVITY IMPAIRMENT: NORMAL
DIARRHEA: 0
COUGH: 0
CHEST PRESSURE: 0
SHORTNESS OF BREATH: 0
DIETARY ISSUES: ADEQUATE INTAKE
NO PATIENT REPORTED PAIN: 1

## 2017-06-29 NOTE — PROGRESS NOTES
"Dawn Caldwell is a 26 year old female patient.  1. Heroin dependence (H)      Past Medical History:   Diagnosis Date     Hep C w/o coma, chronic (H)      Wilms' tumor of left kidney with favorable histology (H)      Current Outpatient Prescriptions   Medication Sig Dispense Refill     buprenorphine-naloxone (SUBOXONE) 8-2 MG SUBL sublingual tablet Place 1 tablet under the tongue daily 15 each 1     Allergies   Allergen Reactions     Codeine Swelling     Tongue swelling     Active Problems:    Heroin dependence (H)    Blood pressure 115/73, pulse 53, temperature 97.2  F (36.2  C), temperature source Oral, resp. rate 16, height 5' 4\" (1.626 m), weight 108 lb (49 kg), last menstrual period 05/16/2017, SpO2 98 %.    Subjective:  Symptoms:  Stable.  No shortness of breath, malaise, cough, chest pain, weakness, headache, chest pressure, anorexia, diarrhea or anxiety.    Diet:  Adequate intake.    Activity level: Normal.    Pain:  She reports no pain.      Objective:  General Appearance:  Comfortable, well-appearing and in no acute distress.    Vital signs: (most recent): Blood pressure 115/73, pulse 53, temperature 97.2  F (36.2  C), temperature source Oral, resp. rate 16, height 5' 4\" (1.626 m), weight 108 lb (49 kg), last menstrual period 05/16/2017, SpO2 98 %.  Vital signs are normal.    Lungs:  Normal respiratory rate and normal effort.  Breath sounds clear to auscultation.    Neurological: Patient is alert and oriented to person, place and time.    Skin:  Warm and dry.      Assessment:    Condition: In serious condition.  Improving.   (Opiate Dependence and Withdrawal   ).     Plan:   (Continue Buprenorphine 4 mg BID  Discharge on sub 8/2 mg once daily #15 with 1 refill  Refer to Suboxone provider covered by her insurance  Arrange treatment - [facility]? [facility]? (Has school beginning in 30 days    Likely discharge tomorrow to live with aunt    20 minutes were spent with patient with more than 50% of time " spent in counseling and coordination of care ).       Brant Estevez  6/29/2017

## 2017-06-29 NOTE — PROGRESS NOTES
Behavioral Health  Note    Behavioral Health  Spirituality Group Note    UNIT 3AW    Name: Dawn Caldwell YOB: 1990   MRN: [id number] Age: 26 year old      Patient attended -led group, which included discussion of spirituality, coping with illness and building resilience.    Patient attended group for 1.0 hrs.    The patient actively participated in group discussion and patient demonstrated an appreciation of topic's application for their personal circumstances. Dawn participated in yoga, reflection, and a lovingkindess meditation activity, relating the topics to sobriety and the role of the human spirit/spirituality in healing and wholeness.      Emma Bass MDiv, [facility]  Staff   Pager [phone]
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
## 2017-06-30 VITALS
BODY MASS INDEX: 18.44 KG/M2 | OXYGEN SATURATION: 98 % | DIASTOLIC BLOOD PRESSURE: 64 MMHG | SYSTOLIC BLOOD PRESSURE: 102 MMHG | HEIGHT: 64 IN | RESPIRATION RATE: 16 BRPM | WEIGHT: 108 LBS | HEART RATE: 53 BPM | TEMPERATURE: 98.5 F

## 2017-06-30 PROCEDURE — 25000132 ZZH RX MED GY IP 250 OP 250 PS 637: Performed by: FAMILY MEDICINE

## 2017-06-30 PROCEDURE — 25000132 ZZH RX MED GY IP 250 OP 250 PS 637: Performed by: EMERGENCY MEDICINE

## 2017-06-30 PROCEDURE — 99238 HOSP IP/OBS DSCHRG MGMT 30/<: CPT | Performed by: FAMILY MEDICINE

## 2017-06-30 RX ADMIN — BUPRENORPHINE HCL 4 MG: 2 TABLET SUBLINGUAL at 08:28

## 2017-06-30 RX ADMIN — HYDROXYZINE HYDROCHLORIDE 50 MG: 25 TABLET ORAL at 08:29

## 2017-06-30 NOTE — DISCHARGE SUMMARY
Psychiatric Discharge Summary    Dawn Caldwell MRN# [id number]   Age: 26 year old YOB: 1990     Date of Admission:  6/26/2017  Date of Discharge:  6/30/2017  Admitting Physician:  Brant Estevez MD  Discharge Physician:  Brant Estevez MD (Contact: [phone])         Event Leading to Hospitalization:   Unable to stop using heroin       See Admission note by Brant Estevez MD  for additional details.          DIagnoses:     Axis I: Opioid dependence     Axis II: none    Axis III: Hepatitis C    Axis IV: moderate psychosocial stressors    Axis V: Global Assessment of Functioning: Admission: 60  Discharge: 60         Labs:          Lab Results   Component Value Date     06/28/2017    Lab Results   Component Value Date    CHLORIDE 109 06/28/2017    Lab Results   Component Value Date    BUN 16 06/28/2017      Lab Results   Component Value Date    POTASSIUM 5.0 06/28/2017    Lab Results   Component Value Date    CO2 28 06/28/2017    Lab Results   Component Value Date    CR 1.29 06/28/2017        No results found for: NTBNPI, NTBNP  Lab Results   Component Value Date    WBC 10.4 06/28/2017    HGB 12.9 06/28/2017    HCT 38.3 06/28/2017    MCV 94 06/28/2017     06/28/2017     Lab Results   Component Value Date    HGB 12.9 06/28/2017     Lab Results   Component Value Date    AST 69 (H) 06/28/2017    ALT 61 (H) 06/28/2017    ALKPHOS 120 06/28/2017    BILITOTAL 0.4 06/28/2017     Lab Results   Component Value Date     06/28/2017     Lab Results   Component Value Date    WBC 10.4 06/28/2017            Consults:   No consultations were requested during this admission         Hospital Course:   Dawn Caldwell was admitted to Station 3A with attending Brnat Estevez MD as a voluntary patient. The patient was placed under status 15 (15 minute checks) to ensure patient safety.   Detoxed with Buprenorphine     All outpatient medications were continued    Dawn Caldwell did participate  in groups and was visible in the milieu.     The patient's symptoms of withdrawal improved.     Dawn Caldwell was transfered to [place]. At the time of discharge Dawn Caldwell was determined to not be a danger to herself or others.          Discharge Medications:     Current Discharge Medication List      START taking these medications    Details   buprenorphine-naloxone (SUBOXONE) 8-2 MG SUBL sublingual tablet Place 1 tablet under the tongue daily  Qty: 15 each, Refills: 1    Associated Diagnoses: Heroin dependence (H)                  Psychiatric Examination:   Appearance:  awake, alert and adequately groomed  Attitude:  cooperative  Eye Contact:  good  Mood:  good  Affect:  appropriate and in normal range  Speech:  clear, coherent  Psychomotor Behavior:  no evidence of tardive dyskinesia, dystonia, or tics  Thought Process:  logical  Associations:  no loose associations  Thought Content:  no evidence of suicidal ideation or homicidal ideation  Insight:  good  Judgment:  fair  Oriented to:  time, person, and place  Attention Span and Concentration:  intact  Recent and Remote Memory:  intact  Language:   Fund of Knowledge:   Muscle Strength and Tone: normal  Gait and Station: Normal         Discharge Plan:   Psychiatric Appointments: none  Psychotherapy Appointments: non3  Other Referrals: [facility]; Suboxone maintenance at [facility]    Attestation:  The patient has been seen and evaluated by me,  Brant Estevez MD

## 2017-06-30 NOTE — PROGRESS NOTES
CASE MANAGEMENT NOTE    Voicemail forwarded from Mabel of intake (ext [phone]) stating she received voicemail from Nirali of [facility] approving 15 tabs of suboxone from 6/30 - 7/3/2017 only.     Carolina Sharpe) TAMY Leach, [facility]  3A Psychotherapist  [phone]

## 2017-06-30 NOTE — PROGRESS NOTES
Pt given copy of her discharge instructions and medication administration instructions. All discharge plans and labs were discussed with patient. Pt reports no questions at this time regarding discharge plans, labs or medications. Pt denies any suicidal ideation, plans or intent at this time. Patient discharge will be assisted via staff member Opal GOOD to the [facility] staff once they arrive to pick her up. Patient is discharged at this time but we are awaiting [facility] staff's arrival to pick her up, expectation is between now and 1400 for departure.

## 2017-06-30 NOTE — DISCHARGE INSTRUCTIONS
Behavioral Discharge Planning and Instructions  THANK YOU FOR CHOOSING THE [address]  [phone]    Summary: You were admitted to [facility] on 6/26/2017 for detoxification from Opioids.  A medical exam was performed that included lab work. You have met with a  and opted to transfer directly to [place] with transportation provided by the program. In addition, suboxone maintenance through [facility].  Please take care and make your recovery a daily priority, Dawn!     Recommendation:  All women residential treatment program. Consider suboxone maintenance. Also consider psychotherapy weekly. The following resources were recommended and provided:  Breaking Free, [facility] ~ A Health Crisis Resource & Support Center, Women's Advocates, [place].    Main Diagnoses:  Per Dr. Estevez;  Heroin dependence     Major Treatments, Procedures and Findings:  You were treated for Opioid detoxification using Subutex (buprenorphine). As an outpatient you will be prescribed suboxone, please take this medication as prescribed until it is gone and you have begun suboxone treatment through a clinic provider. You have had a chemical dependency assessment.  You have had labs drawn and those results have been reviewed with you. Please take a copy of your lab work with you to your next primary care physician appointment.    Symptoms to Report:  If you experience more anxiety, confusion, sleeplessness, deep sadness or thoughts of suicide, notify your treatment team or notify your primary care physician. IF ANY OF THE SYMPTOMS YOU ARE EXPERIENCING ARE A MEDICAL EMERGENCY CALL 911 IMMEDIATELY.     Lifestyle Adjustment: Adjust your lifestyle to get enough sleep, relaxation, exercise and good nutrition. Continue to develop healthy coping skills to decrease stress and promote a sober living environment. Do not use mood altering substances including alcohol, illegal drugs or addictive medications other  than what is currently prescribed.     Treatment Program Provider:  Admission on Friday, 6/29/2017 with pickup between 1 - 2 PM  [facility]  [address] [place] [address]  Ph:  [phone]   [place]:  [phone]    Suboxone Maintenance Provider:  Appointment on  at    [facility])  [address] [place] [address]  Ph:  [phone]    You report you are awaiting a return call at this time from the clinic after calling them yesterday.    Resources:   [facility] Health Crisis Resource & Support Center:  235.283.9514.   [employer], a non-profit organization to support and assist post-incarceration:  923.258.5160.  Breaking Free:  241-462- 4403  Women[employer] Advocates ~ Breaking the Cycle of Domestic Violence:  514.654.4254  http://www.aastpaul.org/?topic=8  http://a[place].org/meetings/  http://www.nam[place].org/index.php/meeting-list-pdf  http://www.harmreduction.org  [facility] 920-017-9837  Support Group:  AA/NA and Sponsor/support  Crisis Intervention: 285.332.7633 or 904-849-0950 (TTY: 459.174.5466).  Call anytime for help.  [place] on Mental Illness (www.[place].quin.org): 436.872.4053 or 902-405-1471.  Alcoholics Anonymous (www.alcoholics-anonymous.org): Check your phone book for your local chapter.  Suicide Awareness Voices of Education (SAVE) (www.save.org): 691-628-IJEJ (4632)  National Suicide Prevention Line (www.mentalhealthmn.org): 610-416-ZEYK (3688)  Mental Health Consumer/Survivor Network of [place] (www.mhcsn.net): 910.467.4206 or 095-211-5267  Mental Health Association of [place] (www.mentalhealth.org): 496.780.1059 or 214-584-7375   Substance Abuse and Mental Health Services (www.samhsa.gov)    [place] Recovery Connection (MRC)  [facility] connects people seeking recovery to resources that help foster and sustain long-term recovery.  Whether you are seeking resources for  treatment, transportation, housing, job training, education, health care or other pathways to recovery, [facility] is a great place to start.  230.906.5946.  www.[facility].org    General Medication Instructions:   See your medication sheet(s) for instructions.   Take all medicines as directed.  Make no changes unless your doctor suggests them.   Go to all your doctor visits.  Be sure to have all your required lab tests. This way, your medicines can be refilled on time.  AA/NA and Sponsors are excellent resources for support and you can find one at any support group meeting.  Do not use any forms of alcohol.    Please Note:  If you have any questions at anytime after you are discharged please call the [facility] [place] detox unit 3AW unit at [phone].  [facility], Behavioral Intake [phone]  Please take this discharge folder with you to all your follow up appointments, it contains your lab results, diagnosis, medication list and discharge recommendations.      THANK YOU FOR CHOOSING THE [facility]

## 2017-06-30 NOTE — PROGRESS NOTES
Peck Cheatham just arrived, pt discharge assisted by staff member Opal GOOD directly downstairs to the staff member. Opal GOOD instructed to hand the discharge medications to the . Pt is now discharged.

## 2017-07-01 NOTE — PROGRESS NOTES
S:  Patient's ride to [place] did not arrive until 16:15.  Though she had been escorted downstairs, the dayshift Krystal MIXON could not stay with the patient until the ride arrived, so the pt was escorted pt back upstairs to 3AW.  Pt was allowed to re-enter the unit and sit in the lounge until the ride did arrive.  Her belongings and medications were re-issued to her when her ride arrived.  She was re-escorted down to the waiting van when the  called and said he was downstairs waiting.

## 2017-07-31 NOTE — H&P
PHYSICIAN:  Dr. Estevez  WORK TYPE:  Admission history and physical examination  PATIENT:  Dawn Caldwell  :  1990  MRN:  [id number]  DOS:  2017    CHIEF COMPLAINT:  Opioid dependence.    PRESENT ILLNESS:  This is the first [facility] admission for the patient who is a 26-year-old female.  Patient has lived most of her life in [place] and moved to the [facility] at the end of .  She has a long history of addiction with heroin being her drug of choice.  She has used intravenously.  She has history of Hepatitis C.  She has had multiple treatments mostly in [place].  She has had no treatment in [place].  She has had some period of sobriety.  She is interested in Methadone maintenance.  She admits to progression, adverse effects and inability to control.  She admits withdrawal when she tries to stop.    She lives with her aunt.  She is unemployed.  She wants to keep her relapse and addiction quiet.  She has not told her family about her relapse and her need for detox.  She would like to do outpatient treatment.  We discussed the above at some length.    She has been abusing Xanax off and on over the past month but not on a daily basis and not to a point where there would be withdrawal.  She uses crack cocaine over the past month with the heroine.  She admits to progression, adverse effects, and inability to control.  She admits to withdrawal symptoms when she tries to stop.  She warrants for further evaluation and therapy.  It has been over 24 hours since she last used  and she is feeling somewhat ill.    PAST MEDICAL HISTORY:  Medical illnesses:  None.  Denies heart disease, lung disease, liver disease, kidney disease, diabetes, or epilepsy.  She has a history of a Wilms tumor as a child.  She has a history of Hepatitis C which has not been treated.    PAST SURGICAL HISTORY:  Appendectomy, cholecystectomy, nephrectomy.    MEDICATIONS:  Prior to  admission:  None.    REVIEW OF SYSTEMS:  SKIN:  No rashes.  HEAD:  No headaches.  EYES:  No visual disturbance.  EARS:  No hearing loss _____.  THROAT:  No difficulty swallowing.  PULMONARY:  No cough or shortness of breath.  CARDIOVASCULAR:  No chest pain.  GASTROINTESTINAL:  No nausea, vomiting, diarrhea or constipation.  GENITOURINARY:  Negative.  MUSCULOSKELETAL:  Negative:  NEUROLOGIC:  Negative.    PHYSCIAL EXAMINATION:  Temperature is 98, pulse is 80, respirations 20, blood pressure is 120/90.  GENERAL:  This is a well-developed, well-nourished 26-year-old female in moderate distress with early opioid withdrawal symptoms.  She is alert and cooperative and oriented.  SKIN:  Normal, no rashes.  HEAD:  Normal.    EYES:   Pupils equal, round, and regular and reactive to light.  Conjunctivae normal, sclera normal.  EARS:  Normal.  NOSE:  Normal.  MOUTH:  Normal mouth and throat.  Lips normal.  Tongue normal.  Pharynx normal.  NECK:  Supple with no masses.  No thyroid enlargement.  Lymph nodes normal with anterior and posterior cervical region.    PULMONARY:  Lungs clear to auscultation.  Good inspiration.  Good expiration.  No wheezes, no rhonchi.  No rales.    CARDIOVASCULAR:  Heart regular rate and rhythm and no murmurs.  Good peripheral pulses.  No edema.    GASTROINTESTINAL:  Abdomen is soft, no distention, tenderness or rigidity.  Bowel sounds are normal.  No masses.  No organomegaly.   EXTREMITIES:  Full range of motion of all the extremities.  No implication of the ankles, knees, hips, hands, shoulders, or elbows bilaterally.    NEUROLOGIC:  Motor normal. Sensory normal.  Coordination normal.  Mental status oriented to time, place, person, and situation.  Attitude is cooperative.    PSYCHIATRIC:  Judgement fair.    ASSESSMENT:    Opioid dependence with withdrawal.    PLAN:    Inpatient detox.

## 2021-10-14 ENCOUNTER — THERAPY VISIT (OUTPATIENT)
Dept: PHYSICAL THERAPY | Facility: CLINIC | Age: 31
End: 2021-10-14
Payer: COMMERCIAL

## 2021-10-14 DIAGNOSIS — M54.50 LUMBAGO: ICD-10-CM

## 2021-10-14 PROCEDURE — 97161 PT EVAL LOW COMPLEX 20 MIN: CPT | Mod: GP | Performed by: PHYSICAL THERAPIST

## 2021-10-14 PROCEDURE — 97110 THERAPEUTIC EXERCISES: CPT | Mod: GP | Performed by: PHYSICAL THERAPIST

## 2021-10-14 NOTE — PROGRESS NOTES
Physical Therapy Initial Evaluation  Subjective:  The history is provided by the patient. No  was used.   Therapist Generated HPI Evaluation  Problem details: Patient reports the onset of low back pain with bending and pulling while doing a job as a .  Symptoms began about 18 months ago.  Patient c/o pain with bending, lifting, and transfers. Physical therapy was ordered on 10/7/2021.  Pain radiates into left calf.  She denies any lower extremity neurological symptoms .         Type of problem:  Lumbar.      Condition occurred with:  Lifting and bending.  Where condition occurred: at work.  Patient reports pain:  Lumbar spine left.  Pain is described as aching and sharp and is constant.  Pain radiates to:  Lower leg left. Pain is worse in the A.M..  Since onset symptoms are gradually worsening.  Associated symptoms:  Loss of motion/stiffness. Symptoms are exacerbated by bending, lifting and sitting (unsupported)  and relieved by activity/movement.  Imaging testing: none.  Past treatment: none.   Restrictions due to condition include:  Working in normal job without restrictions.  Barriers include:  None as reported by patient.    Patient Health History         Pain is reported as 4/10 on pain scale.  General health as reported by patient is good.  Pertinent medical history includes: asthma, cancer and depression.   Red flags:  None as reported by patient.  Medical allergies: other. Other medical allergies details: codeine.   Surgeries include:  Cancer surgery and other. Other surgery history details: Ovary, Bowel, Cancer - kidney removed when 2 years.    Current medications:  Other. Other medications details: Bi-polar meds.       Primary job tasks include:  Lifting/carrying, computer work and pushing/pulling.                                    Objective:  Standing Alignment:        Lumbar deviations alignment: Scoliosis.            Gait:    Gait Type:  Normal                     Lumbar/SI Evaluation  ROM:    AROM Lumbar:   Flexion:          WNL  Ext:                    Min Loss - Repeated Press-up centralizes pain from lateral hip to central low back    Side Bend:        Left:     Right:   Rotation:           Left:     Right:   Side Glide:        Left:  WNL    Right:  WNL        Strength: Fair/Good core strength  Lumbar Myotomes:  normal                  Neural Tension/Mobility:      Left side:SLR or SLR w/DF  negative.     Right side:   SLR w/DF or SLR  negative.   Lumbar Palpation:      Tenderness not present at Left:    Quadratus Lumborum; Erector Spinae; PSIS or Vertebral    Tenderness not present at Right:  Quadratus Lumborum; Erector Spinae; PSIS or Vertebral    Lumbar Provocation:      Left negative with:  Mobility and PROM hip    Right negative with:  Mobility and PROM hip                                                 General     ROS    Assessment/Plan:    Patient is a 30 year old female with lumbar complaints.    Patient has the following significant findings with corresponding treatment plan.                Diagnosis 1:  Lumbar derangement, Scoliosis  Pain -  self management, education and home program  Decreased ROM/flexibility - therapeutic exercise, therapeutic activity and home program  Decreased joint mobility - therapeutic exercise, therapeutic activity and home program  Decreased strength - therapeutic exercise, therapeutic activities and home program  Impaired muscle performance - neuro re-education and home program  Decreased function - therapeutic activities and home program  Impaired posture - neuro re-education, therapeutic activities and home program    Therapy Evaluation Codes:   1) History comprised of:   Personal factors that impact the plan of care:      None.    Comorbidity factors that impact the plan of care are:      None.     Medications impacting care: Anti-depressant.  2) Examination of Body Systems comprised of:   Body structures and functions that  impact the plan of care:      Lumbar spine.   Activity limitations that impact the plan of care are:      Bending, Lifting and Transfers.  3) Clinical presentation characteristics are:   Stable/Uncomplicated.  4) Decision-Making    Low complexity using standardized patient assessment instrument and/or measureable assessment of functional outcome.  Cumulative Therapy Evaluation is: Low complexity.    Previous and current functional limitations:  (See Goal Flow Sheet for this information)    Short term and Long term goals: (See Goal Flow Sheet for this information)     Communication ability:  Patient appears to be able to clearly communicate and understand verbal and written communication and follow directions correctly.  Treatment Explanation - The following has been discussed with the patient:   RX ordered/plan of care  Anticipated outcomes  Possible risks and side effects  This patient would benefit from PT intervention to resume normal activities.   Rehab potential is good.    Frequency:  1 X week, once daily  Duration:  for 6 weeks  Discharge Plan:  Achieve all LTG.  Independent in home treatment program.  Reach maximal therapeutic benefit.    Please refer to the daily flowsheet for treatment today, total treatment time and time spent performing 1:1 timed codes.

## 2021-10-14 NOTE — LETTER
[facility]  [address]  [address]  [place] [address]  [phone]    2021    Re: Dawn Caldwell   :   1990  MRN:  [id number]   REFERRING PHYSICIAN:   Terri CASON [facility]    Date of Initial Evaluation: 10/14/2021  Visits:  Rxs Used: 1 (20 minutes)  Reason for Referral:  Lumbago    EVALUATION SUMMARY    Physical Therapy Initial Evaluation  Subjective:  The history is provided by the patient. No  was used.   Therapist Generated HPI Evaluation  Problem details: Patient reports the onset of low back pain with bending and pulling while doing a job as a .  Symptoms began about 18 months ago.  Patient c/o pain with bending, lifting, and transfers. Physical therapy was ordered on 10/7/2021.  Pain radiates into left calf.  She denies any lower extremity neurological symptoms .         Type of problem:  Lumbar.    Condition occurred with:  Lifting and bending.  Where condition occurred: at work.  Patient reports pain:  Lumbar spine left.  Pain is described as aching and sharp and is constant.  Pain radiates to:  Lower leg left. Pain is worse in the A.M..  Since onset symptoms are gradually worsening.  Associated symptoms:  Loss of motion/stiffness. Symptoms are exacerbated by bending, lifting and sitting (unsupported)  and relieved by activity/movement.  Imaging testing: none.  Past treatment: none.   Restrictions due to condition include:  Working in normal job without restrictions.  Barriers include:  None as reported by patient.    Patient Health History  Pain is reported as 4/10 on pain scale.  General health as reported by patient is good.  Pertinent medical history includes: asthma, cancer and depression.   Red flags:  None as reported by patient.  Medical allergies: other. Other medical allergies details: codeine.   Surgeries include:  Cancer surgery and other. Other  surgery history details: Ovary, Bowel, Cancer - kidney removed when 2 years.    Current medications:  Other. Other medications details: Bi-polar meds.    Primary job tasks include:  Lifting/carrying, computer work and pushing/pulling.                Dawn Caldwell   :   1990    Objective:  Standing Alignment:    Lumbar deviations alignment: Scoliosis.    Gait:    Gait Type:  Normal       Lumbar/SI Evaluation  ROM:    AROM Lumbar:   Flexion:          WNL  Ext:                    Min Loss - Repeated Press-up centralizes pain from lateral hip to central low back    Side Bend:        Left:     Right:   Rotation:           Left:     Right:   Side Glide:        Left:  WNL    Right:  WNL        Strength: Fair/Good core strength  Lumbar Myotomes:  normal  Neural Tension/Mobility:      Left side:SLR or SLR w/DF  negative.   Right side:   SLR w/DF or SLR  negative.   Lumbar Palpation:      Tenderness not present at Left:    Quadratus Lumborum; Erector Spinae; PSIS or Vertebral    Tenderness not present at Right:  Quadratus Lumborum; Erector Spinae; PSIS or Vertebral    Lumbar Provocation:      Left negative with:  Mobility and PROM hip    Right negative with:  Mobility and PROM hip        Assessment/Plan:    Patient is a 30 year old female with lumbar complaints.    Patient has the following significant findings with corresponding treatment plan.                Diagnosis 1:  Lumbar derangement, Scoliosis  Pain -  self management, education and home program  Decreased ROM/flexibility - therapeutic exercise, therapeutic activity and home program  Decreased joint mobility - therapeutic exercise, therapeutic activity and home program  Decreased strength - therapeutic exercise, therapeutic activities and home program  Impaired muscle performance - neuro re-education and home program  Decreased function - therapeutic activities and home program  Impaired posture - neuro re-education, therapeutic activities and home  lauren Caldwell   :   1990    Therapy Evaluation Codes:   1) History comprised of:   Personal factors that impact the plan of care:      None.    Comorbidity factors that impact the plan of care are:      None.     Medications impacting care: Anti-depressant.  2) Examination of Body Systems comprised of:   Body structures and functions that impact the plan of care:      Lumbar spine.   Activity limitations that impact the plan of care are:      Bending, Lifting and Transfers.  3) Clinical presentation characteristics are:   Stable/Uncomplicated.  4) Decision-Making    Low complexity using standardized patient assessment instrument and/or measureable assessment of functional outcome.  Cumulative Therapy Evaluation is: Low complexity.    Previous and current functional limitations:  (See Goal Flow Sheet for this information)    Short term and Long term goals: (See Goal Flow Sheet for this information)     Communication ability:  Patient appears to be able to clearly communicate and understand verbal and written communication and follow directions correctly.  Treatment Explanation - The following has been discussed with the patient:   RX ordered/plan of care  Anticipated outcomes  Possible risks and side effects  This patient would benefit from PT intervention to resume normal activities.   Rehab potential is good.    Frequency:  1 X week, once daily  Duration:  for 6 weeks  Discharge Plan:  Achieve all LTG.  Independent in home treatment program.  Reach maximal therapeutic benefit.    Thank you for your referral.    INQUIRIES  Therapist: Jacob Restrepo PT  [address] [address]  Phone: [phone]  Fax: [phone]

## 2021-10-28 ENCOUNTER — LAB REQUISITION (OUTPATIENT)
Dept: LAB | Facility: CLINIC | Age: 31
End: 2021-10-28
Payer: COMMERCIAL

## 2021-10-28 DIAGNOSIS — Z00.00 ENCOUNTER FOR GENERAL ADULT MEDICAL EXAMINATION WITHOUT ABNORMAL FINDINGS: ICD-10-CM

## 2021-10-28 DIAGNOSIS — N92.6 IRREGULAR MENSTRUATION, UNSPECIFIED: ICD-10-CM

## 2021-10-28 LAB
PROLACTIN SERPL-MCNC: 12 UG/L (ref 3–27)
TSH SERPL DL<=0.005 MIU/L-ACNC: 0.82 MU/L (ref 0.4–4)

## 2021-10-28 PROCEDURE — 84146 ASSAY OF PROLACTIN: CPT | Mod: ORL | Performed by: FAMILY MEDICINE

## 2021-10-28 PROCEDURE — G0145 SCR C/V CYTO,THINLAYER,RESCR: HCPCS | Mod: ORL | Performed by: FAMILY MEDICINE

## 2021-10-28 PROCEDURE — 84443 ASSAY THYROID STIM HORMONE: CPT | Mod: ORL | Performed by: FAMILY MEDICINE

## 2021-10-28 PROCEDURE — 87624 HPV HI-RISK TYP POOLED RSLT: CPT | Mod: ORL | Performed by: FAMILY MEDICINE

## 2021-11-01 LAB
BKR LAB AP GYN ADEQUACY: NORMAL
BKR LAB AP GYN INTERPRETATION: NORMAL
BKR LAB AP HPV REFLEX: NORMAL
BKR LAB AP PREVIOUS ABNORMAL: NORMAL
PATH REPORT.COMMENTS IMP SPEC: NORMAL
PATH REPORT.RELEVANT HX SPEC: NORMAL

## 2021-11-03 LAB
HUMAN PAPILLOMA VIRUS 16 DNA: NEGATIVE
HUMAN PAPILLOMA VIRUS 18 DNA: NEGATIVE
HUMAN PAPILLOMA VIRUS FINAL DIAGNOSIS: NORMAL
HUMAN PAPILLOMA VIRUS OTHER HR: NEGATIVE

## 2022-02-01 NOTE — H&P
"27 y/o female admitted for detox from opioids  Several year history of heroin dependence  Previous treatment in [place]  Has been in [place] 6 months; no treatment here  Using off and on but daily for last month  Occasional benzodiazepine use but not daily  No MAT history   Interested in Methadone or Suboxone and treatment     Lives with aunt  Not working    Has withdrawal when stops    PMH: Hepatitis C, not rx'ed  Wilms tumor    PSH: Cholecystectomy  Appendectomy  Nephrectomy    Medications: see list      ROS:  Constitutional, neuro, ENT, endocrine, pulmonary, cardiac, gastrointestinal, genitourinary, musculoskeletal, integument and psychiatric systems are negative, except as otherwise noted.    /60 (BP Location: Left arm)  Pulse 56  Temp 98  F (36.7  C) (Tympanic)  Resp 16  Ht 5' 4\" (1.626 m)  Wt 108 lb (49 kg)  LMP 05/16/2017  SpO2 98%  BMI 18.54 kg/m2  EXAM:  GENERAL APPEARANCE: healthy, alert and no distress  EYES: Eyes grossly normal to inspection, PERRL and conjunctivae and sclerae normal  HENT: ear canals and TM's normal and nose and mouth without ulcers or lesions  NECK: no adenopathy, no asymmetry, masses, or scars and thyroid normal to palpation  RESP: lungs clear to auscultation - no rales, rhonchi or wheezes  CV: regular rates and rhythm, normal S1 S2, no S3 or S4 and no murmur, click or rub  LYMPHATICS: normal ant/post cervical and supraclavicular nodes  ABDOMEN: soft, nontender, without hepatosplenomegaly or masses and bowel sounds normal  MS: extremities normal- no gross deformities noted  ORTHO: normal  SKIN: no suspicious lesions or rashes  NEURO: Normal strength and tone, mentation intact and speech normal  PSYCH: mentation appears normal and affect normal/bright  MENTAL STATUS EXAM:  Appearance/Behavior: No apparent distress and Casually groomed  Speech: Normal  Mood/Affect: normal affect  Insight: Adequate    ASSESSMENT: Opiate Dependence and Withdrawal      PLAN: " CD in mail today but unfortunately is  cracked and not usable. VM left for patient to resend in a padded envelope.    detox  treatment   MAT

## 2023-08-28 ENCOUNTER — LAB REQUISITION (OUTPATIENT)
Dept: LAB | Facility: CLINIC | Age: 33
End: 2023-08-28
Payer: COMMERCIAL

## 2023-08-28 DIAGNOSIS — R42 DIZZINESS AND GIDDINESS: ICD-10-CM

## 2023-08-28 LAB
ALBUMIN SERPL BCG-MCNC: 4.5 G/DL (ref 3.5–5.2)
ALP SERPL-CCNC: 98 U/L (ref 35–104)
ALT SERPL W P-5'-P-CCNC: 13 U/L (ref 0–50)
ANION GAP SERPL CALCULATED.3IONS-SCNC: 13 MMOL/L (ref 7–15)
AST SERPL W P-5'-P-CCNC: 37 U/L (ref 0–45)
BILIRUB SERPL-MCNC: <0.2 MG/DL
BUN SERPL-MCNC: 14 MG/DL (ref 6–20)
CALCIUM SERPL-MCNC: 8.9 MG/DL (ref 8.6–10)
CHLORIDE SERPL-SCNC: 104 MMOL/L (ref 98–107)
CREAT SERPL-MCNC: 1.04 MG/DL (ref 0.51–0.95)
DEPRECATED HCO3 PLAS-SCNC: 24 MMOL/L (ref 22–29)
GFR SERPL CREATININE-BSD FRML MDRD: 72 ML/MIN/1.73M2
GLUCOSE SERPL-MCNC: 97 MG/DL (ref 70–99)
POTASSIUM SERPL-SCNC: 4.5 MMOL/L (ref 3.4–5.3)
PROT SERPL-MCNC: 7 G/DL (ref 6.4–8.3)
SODIUM SERPL-SCNC: 141 MMOL/L (ref 136–145)
TSH SERPL DL<=0.005 MIU/L-ACNC: 1.32 UIU/ML (ref 0.3–4.2)

## 2023-08-28 PROCEDURE — 84443 ASSAY THYROID STIM HORMONE: CPT | Mod: ORL | Performed by: FAMILY MEDICINE

## 2023-08-28 PROCEDURE — 80053 COMPREHEN METABOLIC PANEL: CPT | Mod: ORL | Performed by: FAMILY MEDICINE

## 2025-07-02 ENCOUNTER — TRANSCRIBE ORDERS (OUTPATIENT)
Dept: OTHER | Age: 35
End: 2025-07-02

## 2025-07-02 DIAGNOSIS — M54.42 CHRONIC LEFT-SIDED LOW BACK PAIN WITH LEFT-SIDED SCIATICA: Primary | ICD-10-CM

## 2025-07-02 DIAGNOSIS — G89.29 CHRONIC LEFT-SIDED LOW BACK PAIN WITH LEFT-SIDED SCIATICA: Primary | ICD-10-CM
